# Patient Record
(demographics unavailable — no encounter records)

---

## 2024-12-06 NOTE — PHYSICAL EXAM
[Clear] : right tympanic membrane clear [Clear Rhinorrhea] : clear rhinorrhea [Wheezing] : wheezing [NL] : warm, clear [Acute Distress] : no acute distress [Crackles] : no crackles [Tachypnea] : no tachypnea [Rhonchi] : no rhonchi [Belly Breathing] : no belly breathing [Subcostal Retractions] : no subcostal retractions [Suprasternal Retractions] : no suprasternal retractions

## 2024-12-06 NOTE — HISTORY OF PRESENT ILLNESS
[EENT/Resp Symptoms] : EENT/RESPIRATORY SYMPTOMS [Nasal congestion] : nasal congestion [Runny nose] : runny nose [___ Day(s)] : [unfilled] day(s) [Acetaminophen] : acetaminophen [Last dose: _____] : last dose: [unfilled] [Fever] : fever [Runny Nose] : runny nose [Nasal Congestion] : nasal congestion [Cough] : cough [Improving] : improving [Constant] : constant [Sick Contacts: ___] : sick contacts: [unfilled] [Mucoid discharge] : mucoid discharge [Wet cough] : wet cough [Max Temp: ____] : Max temperature: [unfilled] [Eye Discharge] : no eye discharge [Wheezing] : no wheezing [Vomiting] : no vomiting [Diarrhea] : no diarrhea

## 2024-12-06 NOTE — DISCUSSION/SUMMARY
[FreeTextEntry1] : Assessment and Plan: - Respiratory Syncytial Virus (RSV) : The provider suspects that the child may have Respiratory Syncytial Virus (RSV), which is a common cause of respiratory illness in children. - Therapeutic Interventions: Continue supportive care with albuterol nebulizer treatments and fever management as needed. - Diagnostic Tests: The provider recommends obtaining an RSV test to confirm the diagnosis. - Patient Education: The provider educates the patient on the typical course of RSV and when to seek further medical attention if symptoms worsen or persist. Trouble breathing, rapid breathing, shortness of breath especially with fever to call us or go to the ER immediately - Follow-Up: The provider recommends following up with the test results and reassessing the child's condition.

## 2024-12-24 NOTE — DISCUSSION/SUMMARY
[Family Support] : family support [Child Development and Behavior] : child development and behavior [Language Promotion/Hearing] : language promotion/hearing [Toliet Training Readiness] : toliet training readiness [Safety] : safety [No Medications] : ~He/She~ is not on any medications [Mother] : mother [Father] : father [] : The components of the vaccine(s) to be administered today are listed in the plan of care. The disease(s) for which the vaccine(s) are intended to prevent and the risks have been discussed with the caretaker.  The risks are also included in the appropriate vaccination information statements which have been provided to the patient's caregiver.  The caregiver has given consent to vaccinate. [FreeTextEntry1] : Impacted cerumen removed with curette. Procedure well tolerated.   Assessment and Plan: - Acute Otitis Media : Puneet has developed an ear infection, likely secondary to his recent RSV infection. This is due to the collection of mucus in the ear. - Prescribe antibiotic to be taken once daily for 10 days - Continue Albuterol as previously prescribed - Administer scheduled vaccines, including pneumococcal vaccine - Recommend Tylenol for comfort or if fever develops - Follow up in 6 months for next well visit - Parents advised to find a local pediatrician or pediatric urgent care for sick visits - Consider direct care or [facility] medical service for more accessible care  - Persistent Cough Post-RSV : Puneet continues to experience cough following RSV infection, which is expected as RSV cough can persist for an extended period. - Continue monitoring cough - Use Albuterol for phlegmy cough - Use saline for dry cough - Educate parents on the typical course of post-RSV cough  - Behavioral Concerns - Head Banging : Parents report head banging behavior during tantrums, which appears to be within normal developmental range for Puneet's age. - Reassure parents that this behavior can be normal at this age - Monitor behavior at future visits - Provide guidance on managing tantrums and ensuring safety during episodes - Continue whole cow's milk. Continue table foods, 3 meals with 2-3 snacks per day. Incorporate flourinated water daily in a sippy cup. Brush teeth twice a day with soft toothbrush. Recommend visit to dentist. When in car, keep child in rear-facing car seats until age 2, or until  the maximum height and weight for seat is reached. Put todder to sleep in own bed or crib. Help toddler to maintain consistent daily routines and sleep schedule. Toilet training discussed. Recognize anxiety in new settings. Ensure home is safe. Be within arm's reach of toddler at all times. Use consistent, positive discipline. Read aloud to toddler.

## 2024-12-24 NOTE — PHYSICAL EXAM
[Alert] : alert [No Acute Distress] : no acute distress [Normocephalic] : normocephalic [Anterior [place] Closed] : anterior fontanelle closed [Red Reflex Bilateral] : red reflex bilateral [PERRL] : PERRL [Normally Placed Ears] : normally placed ears [Auricles Well Formed] : auricles well formed [Nares Patent] : nares patent [Palate Intact] : palate intact [Uvula Midline] : uvula midline [Tooth Eruption] : tooth eruption  [Supple, full passive range of motion] : supple, full passive range of motion [No Palpable Masses] : no palpable masses [Symmetric Chest Rise] : symmetric chest rise [Regular Rate and Rhythm] : regular rate and rhythm [S1, S2 present] : S1, S2 present [No Murmurs] : no murmurs [+2 Femoral Pulses] : +2 femoral pulses [Soft] : soft [NonTender] : non tender [Non Distended] : non distended [Normoactive Bowel Sounds] : normoactive bowel sounds [No Hepatomegaly] : no hepatomegaly [No Splenomegaly] : no splenomegaly [Central Urethral Opening] : central urethral opening [Testicles Descended Bilaterally] : testicles descended bilaterally [Patent] : patent [Normally Placed] : normally placed [No Abnormal Lymph Nodes Palpated] : no abnormal lymph nodes palpated [No Clavicular Crepitus] : no clavicular crepitus [Symmetric Buttocks Creases] : symmetric buttocks creases [No Spinal Dimple] : no spinal dimple [NoTuft of Hair] : no tuft of hair [Cranial Nerves Grossly Intact] : cranial nerves grossly intact [No Rash or Lesions] : no rash or lesions [FreeTextEntry3] : cerumen in both ear canals, after removal, TMs with purulent effusion and erythema [FreeTextEntry4] : clear rhinorrhea [FreeTextEntry7] : b/l wheezes, no resp distress

## 2024-12-24 NOTE — DEVELOPMENTAL MILESTONES
[Engages with others for play] : engages with others for play [Help dress and undress self] : help dress and undress self [Points to pictures in book] : points to pictures in book [Points to object of interest to] : points to object of interest to draw attention to it [Turns and looks at adult if] : turns and looks at adult if something new happens [Begins to scoop with spoon] : begins to scoop with spoon [Uses 6 to 10 words other than] : uses 6 to 10 words other than names [Walks up with 2 feet per step] : walks up with 2 feet per step with hand held [Sits in small chair] : sits in small chair [Carries toy while walking] : carries toy while walking [Scribbles spontaneously] : scribbles spontaneously [Throws small ball a few feet] : throws a small ball a few feet while standing [Passed] : passed [Yes] : Completed. [Identifies at least 2 body parts] : does not indentify at least 2 body parts

## 2024-12-24 NOTE — PHYSICAL EXAM
[Alert] : alert [No Acute Distress] : no acute distress [Normocephalic] : normocephalic [Anterior [place] Closed] : anterior fontanelle closed [Red Reflex Bilateral] : red reflex bilateral [PERRL] : PERRL [Normally Placed Ears] : normally placed ears [Auricles Well Formed] : auricles well formed [Nares Patent] : nares patent [Palate Intact] : palate intact [Uvula Midline] : uvula midline [Tooth Eruption] : tooth eruption  [Supple, full passive range of motion] : supple, full passive range of motion [No Palpable Masses] : no palpable masses [Symmetric Chest Rise] : symmetric chest rise [Regular Rate and Rhythm] : regular rate and rhythm [S1, S2 present] : S1, S2 present [No Murmurs] : no murmurs [+2 Femoral Pulses] : +2 femoral pulses [Soft] : soft [NonTender] : non tender [Non Distended] : non distended [Normoactive Bowel Sounds] : normoactive bowel sounds [No Hepatomegaly] : no hepatomegaly [No Splenomegaly] : no splenomegaly [Central Urethral Opening] : central urethral opening [Testicles Descended Bilaterally] : testicles descended bilaterally [Patent] : patent [Normally Placed] : normally placed [No Abnormal Lymph Nodes Palpated] : no abnormal lymph nodes palpated [No Clavicular Crepitus] : no clavicular crepitus [Symmetric Buttocks Creases] : symmetric buttocks creases [No Spinal Dimple] : no spinal dimple [NoTuft of Hair] : no tuft of hair [Cranial Nerves Grossly Intact] : cranial nerves grossly intact [No Rash or Lesions] : no rash or lesions [FreeTextEntry3] : cerumen in both ear canals, after removal, TMs with purulent effusion and erythema [FreeTextEntry4] : clear rhinorrhea [FreeTextEntry7] : b/l wheezes, no resp distress

## 2024-12-24 NOTE — HISTORY OF PRESENT ILLNESS
[Parents] : parents [Cow's milk (Ounces per day ___)] : consumes [unfilled] oz of Cow's milk per day [Fruit] : fruit [Vegetables] : vegetables [Meat] : meat [Cereal] : cereal [Eggs] : eggs [Baby food] : baby food [Finger Foods] : finger foods [Table food] : table food [___ stools per day] : [unfilled]  stools per day [Firm] : firm consistency [Normal] : Normal [In crib] : In crib [Wakes up at night] : Wakes up at night [Sippy cup use] : Sippy cup use [Brushing teeth] : Brushing teeth [Toothpaste] : Primary Fluoride Source: Toothpaste [Playtime] : Playtime  [Temper Tantrums] : Temper Tantrums [No] : Not at  exposure [Water heater temperature set at <120 degrees F] : Water heater temperature set at <120 degrees F [Car seat in back seat] : Car seat in back seat [Carbon Monoxide Detectors] : Carbon monoxide detectors [Smoke Detectors] : Smoke detectors [Up to date] : Up to date [Exposure to electronic nicotine delivery system] : No exposure to electronic nicotine delivery system [FreeTextEntry3] : Wakes for comfort [FreeTextEntry1] : Puneet is presenting for a follow-up visit after a recent RSV infection. The patient's cough has persisted since the RSV infection, which the parents note is sometimes phlegmy and sometimes dry. The parents are also concerned about Puneet's behavior when he gets upset, specifically that he bangs his head. The parents describe this as tantrum-like behavior. Puneet's development appears to be progressing normally, as he knows some body parts and responds to commands. He has also started using some words.

## 2024-12-24 NOTE — DISCUSSION/SUMMARY
[Family Support] : family support [Child Development and Behavior] : child development and behavior [Language Promotion/Hearing] : language promotion/hearing [Toliet Training Readiness] : toliet training readiness [Safety] : safety [No Medications] : ~He/She~ is not on any medications [Mother] : mother [Father] : father [] : The components of the vaccine(s) to be administered today are listed in the plan of care. The disease(s) for which the vaccine(s) are intended to prevent and the risks have been discussed with the caretaker.  The risks are also included in the appropriate vaccination information statements which have been provided to the patient's caregiver.  The caregiver has given consent to vaccinate. [FreeTextEntry1] : Impacted cerumen removed with curette. Procedure well tolerated.   Assessment and Plan: - Acute Otitis Media : Puneet has developed an ear infection, likely secondary to his recent RSV infection. This is due to the collection of mucus in the ear. - Prescribe antibiotic to be taken once daily for 10 days - Continue Albuterol as previously prescribed - Administer scheduled vaccines, including pneumococcal vaccine - Recommend Tylenol for comfort or if fever develops - Follow up in 6 months for next well visit - Parents advised to find a local pediatrician or pediatric urgent care for sick visits - Consider direct care or [facility] medical service for more accessible care  - Persistent Cough Post-RSV : Puneet continues to experience cough following RSV infection, which is expected as RSV cough can persist for an extended period. - Continue monitoring cough - Use Albuterol for phlegmy cough - Use saline for dry cough - Educate parents on the typical course of post-RSV cough  - Behavioral Concerns - Head Banging : Parents report head banging behavior during tantrums, which appears to be within normal developmental range for Puneet's age. - Reassure parents that this behavior can be normal at this age - Monitor behavior at future visits - Provide guidance on managing tantrums and ensuring safety during episodes - Continue whole cow's milk. Continue table foods, 3 meals with 2-3 snacks per day. Incorporate flourinated water daily in a sippy cup. Brush teeth twice a day with soft toothbrush. Recommend visit to dentist. When in car, keep child in rear-facing car seats until age 2, or until  the maximum height and weight for seat is reached. Put todder to sleep in own bed or crib. Help toddler to maintain consistent daily routines and sleep schedule. Toilet training discussed. Recognize anxiety in new settings. Ensure home is safe. Be within arm's reach of toddler at all times. Use consistent, positive discipline. Read aloud to toddler.

## 2025-01-24 NOTE — HISTORY OF PRESENT ILLNESS
[EENT/Resp Symptoms] : EENT/RESPIRATORY SYMPTOMS [Fever] : fever [Runny Nose] : runny nose [Nasal Congestion] : nasal congestion [Cough] : cough [Decreased Appetite] : no decreased appetite [Vomiting] : no vomiting [Diarrhea] : no diarrhea [Decreased Urine Output] : no decreased urine output [FreeTextEntry1] : fever started this afternoon [de-identified] : fever and red eye [FreeTextEntry6] : parents brought pt in due to fevering and discharge in both eye pt symptoms started a few days ago. fever of 101 during time of visit.

## 2025-01-24 NOTE — HISTORY OF PRESENT ILLNESS
[EENT/Resp Symptoms] : EENT/RESPIRATORY SYMPTOMS [Fever] : fever [Runny Nose] : runny nose [Nasal Congestion] : nasal congestion [Cough] : cough [Decreased Appetite] : no decreased appetite [Vomiting] : no vomiting [Diarrhea] : no diarrhea [Decreased Urine Output] : no decreased urine output [FreeTextEntry1] : fever started this afternoon [de-identified] : fever and red eye [FreeTextEntry6] : parents brought pt in due to fevering and discharge in both eye pt symptoms started a few days ago. fever of 101 during time of visit.

## 2025-01-24 NOTE — HISTORY OF PRESENT ILLNESS
[EENT/Resp Symptoms] : EENT/RESPIRATORY SYMPTOMS [Fever] : fever [Runny Nose] : runny nose [Nasal Congestion] : nasal congestion [Cough] : cough [Decreased Appetite] : no decreased appetite [Vomiting] : no vomiting [Diarrhea] : no diarrhea [Decreased Urine Output] : no decreased urine output [FreeTextEntry1] : fever started this afternoon [de-identified] : fever and red eye [FreeTextEntry6] : parents brought pt in due to fevering and discharge in both eye pt symptoms started a few days ago. fever of 101 during time of visit.